# Patient Record
Sex: FEMALE | URBAN - METROPOLITAN AREA
[De-identification: names, ages, dates, MRNs, and addresses within clinical notes are randomized per-mention and may not be internally consistent; named-entity substitution may affect disease eponyms.]

---

## 2024-01-22 ENCOUNTER — OFFICE VISIT (OUTPATIENT)
Dept: URBAN - METROPOLITAN AREA CLINIC 60 | Facility: CLINIC | Age: 29
End: 2024-01-22
Payer: COMMERCIAL

## 2024-01-22 VITALS
TEMPERATURE: 98.7 F | OXYGEN SATURATION: 100 % | HEART RATE: 85 BPM | RESPIRATION RATE: 12 BRPM | WEIGHT: 156 LBS | BODY MASS INDEX: 26.63 KG/M2 | DIASTOLIC BLOOD PRESSURE: 68 MMHG | SYSTOLIC BLOOD PRESSURE: 118 MMHG | HEIGHT: 64 IN

## 2024-01-22 DIAGNOSIS — K21.9 GASTROESOPHAGEAL REFLUX DISEASE, UNSPECIFIED WHETHER ESOPHAGITIS PRESENT: ICD-10-CM

## 2024-01-22 DIAGNOSIS — K59.00 CONSTIPATION, UNSPECIFIED CONSTIPATION TYPE: ICD-10-CM

## 2024-01-22 PROBLEM — 14760008: Status: ACTIVE | Noted: 2024-01-22

## 2024-01-22 PROBLEM — 235595009: Status: ACTIVE | Noted: 2024-01-22

## 2024-01-22 PROCEDURE — 99203 OFFICE O/P NEW LOW 30 MIN: CPT | Performed by: INTERNAL MEDICINE

## 2024-01-22 RX ORDER — OMEPRAZOLE 40 MG/1
1 CAPSULE 30 MINUTES BEFORE MORNING MEAL CAPSULE, DELAYED RELEASE ORAL ONCE A DAY
Qty: 30 | Refills: 3 | OUTPATIENT
Start: 2024-01-22

## 2024-01-22 RX ORDER — ADHESIVE TAPE 3"X 2.3 YD
AS DIRECTED TAPE, NON-MEDICATED TOPICAL
Status: ACTIVE | COMMUNITY

## 2024-01-22 NOTE — HPI-TODAY'S VISIT:
Avila is a 30 y/o female that presents today as a new patient. She is having constipation. It seems to be better recently. Her main complaint in the office today is acid reflux. She says that she has had this for a few years, but worse in the last month. She has feels an acid taste in her mouth and has heartburn. She has Omeprazole OTC which she will take as needed, but is not taking this regularly. Denies dysphagia. Denies abdominal pain. Denies nausea or vomiting. Denies blood in the stool. Denies weight loss.

## 2024-03-27 ENCOUNTER — OV EP (OUTPATIENT)
Dept: URBAN - METROPOLITAN AREA CLINIC 60 | Facility: CLINIC | Age: 29
End: 2024-03-27

## 2024-03-27 RX ORDER — OMEPRAZOLE 40 MG/1
1 CAPSULE 30 MINUTES BEFORE MORNING MEAL CAPSULE, DELAYED RELEASE ORAL ONCE A DAY
Qty: 30 | Refills: 3 | Status: ACTIVE | COMMUNITY
Start: 2024-01-22

## 2024-03-27 RX ORDER — ADHESIVE TAPE 3"X 2.3 YD
AS DIRECTED TAPE, NON-MEDICATED TOPICAL
Status: ACTIVE | COMMUNITY